# Patient Record
Sex: MALE | Race: WHITE | NOT HISPANIC OR LATINO | URBAN - METROPOLITAN AREA
[De-identification: names, ages, dates, MRNs, and addresses within clinical notes are randomized per-mention and may not be internally consistent; named-entity substitution may affect disease eponyms.]

---

## 2017-02-08 ENCOUNTER — EMERGENCY (EMERGENCY)
Facility: HOSPITAL | Age: 32
LOS: 0 days | Discharge: HOME | End: 2017-02-08
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
## 2017-06-27 DIAGNOSIS — R07.81 PLEURODYNIA: ICD-10-CM

## 2017-06-27 DIAGNOSIS — S00.81XA ABRASION OF OTHER PART OF HEAD, INITIAL ENCOUNTER: ICD-10-CM

## 2017-06-27 DIAGNOSIS — S22.32XA FRACTURE OF ONE RIB, LEFT SIDE, INITIAL ENCOUNTER FOR CLOSED FRACTURE: ICD-10-CM

## 2017-06-27 DIAGNOSIS — S16.1XXA STRAIN OF MUSCLE, FASCIA AND TENDON AT NECK LEVEL, INITIAL ENCOUNTER: ICD-10-CM

## 2018-07-09 ENCOUNTER — INPATIENT (INPATIENT)
Facility: HOSPITAL | Age: 33
LOS: 1 days | Discharge: HOME | End: 2018-07-11
Attending: INTERNAL MEDICINE | Admitting: INTERNAL MEDICINE

## 2018-07-09 VITALS
SYSTOLIC BLOOD PRESSURE: 125 MMHG | DIASTOLIC BLOOD PRESSURE: 68 MMHG | WEIGHT: 258.6 LBS | RESPIRATION RATE: 16 BRPM | HEART RATE: 64 BPM | HEIGHT: 72 IN | TEMPERATURE: 97 F

## 2018-07-09 DIAGNOSIS — G47.30 SLEEP APNEA, UNSPECIFIED: ICD-10-CM

## 2018-07-09 DIAGNOSIS — R56.9 UNSPECIFIED CONVULSIONS: ICD-10-CM

## 2018-07-09 RX ORDER — ACETAMINOPHEN 500 MG
650 TABLET ORAL EVERY 6 HOURS
Qty: 0 | Refills: 0 | Status: DISCONTINUED | OUTPATIENT
Start: 2018-07-09 | End: 2018-07-11

## 2018-07-09 RX ORDER — ENOXAPARIN SODIUM 100 MG/ML
40 INJECTION SUBCUTANEOUS EVERY 24 HOURS
Qty: 0 | Refills: 0 | Status: DISCONTINUED | OUTPATIENT
Start: 2018-07-09 | End: 2018-07-11

## 2018-07-09 NOTE — CONSULT NOTE ADULT - ATTENDING COMMENTS
Agree with the history and plan.  Srikanth is here for risk assessment and possible capturing of an episode that based on the history is suggestive of nocturnal seizure  will start the monitoring.  No AED  Seizure precaution

## 2018-07-09 NOTE — H&P ADULT - NSHPPHYSICALEXAM_GEN_ALL_CORE
PHYSICAL EXAM:  GENERAL: NAD, well-groomed, well-developed  HEAD:  Atraumatic, Normocephalic  EYES: EOMI, PERRLA, conjunctiva and sclera clear  NERVOUS SYSTEM:  Alert & Oriented X 4, Good concentration; Motor Strength 5/5 B/L upper and lower extremities; DTRs 2+ intact and symmetric  CHEST/LUNG: Clear to percussion bilaterally; No rales, rhonchi, wheezing, or rubs  HEART: Regular rate and rhythm; No murmurs, rubs, or gallops  ABDOMEN: Soft, Nontender, Nondistended; Bowel sounds present  EXTREMITIES:  2+ Peripheral Pulses, No clubbing, cyanosis, or edema  SKIN: No rashes or lesions

## 2018-07-09 NOTE — CONSULT NOTE ADULT - SUBJECTIVE AND OBJECTIVE BOX
Neurology/Epilepsy Consult:    ADALBERTO NELSON 33y Male  [id number]    Patient is a 33y old right-handed Male who presents for elective VEEG      HPI:  Several years ago patient started waking up at night with       PAST MEDICAL & SURGICAL HISTORY:  Obstructive sleep apnea  Deviated septum  H/O gynecomastia  H/O nasal septoplasty:         FAMILY HISTORY:  Sister - possible seizures as a teenager      Social History: (-) x 3      Risk factors:  Born full-term, no complications  Normal growth and development  No febrile seizures/CNS infections  No head trauma with loss of consciousness      Allergy:  No Known Allergies        Home Medications:  ZyrTEC 10 mg oral tablet: 1 tab(s) orally once a day as needed        MEDICATIONS  (STANDING):  enoxaparin Injectable 40 milliGRAM(s) SubCutaneous every 24 hours    MEDICATIONS  (PRN):  acetaminophen   Tablet 650 milliGRAM(s) Oral every 6 hours PRN For Temp greater than 38 C (100.4 F)  acetaminophen   Tablet. 650 milliGRAM(s) Oral every 6 hours PRN Mild Pain (1 - 3)  LORazepam   Injectable 2 milliGRAM(s) IV Push three times a day PRN generalized tonic-clonic seizure lasting longer than 2 minutes, or two consecutive seizures without return to baseline in-between      T(F): 96.8 (18 @ 10:15), Max: 96.8 (18 @ 10:15)  HR: 64 (18 @ 10:15) (64 - 64)  BP: 125/68 (18 @ 10:15) (125/68 - 125/68)  RR: 16 (18 @ 10:15) (16 - 16)  SpO2: --      Neurologic Examination:  General:  Appearance is consistent with chronologic age.  No abnormal facies.   General: The patient is oriented to person, place, time and date.  Recent and remote memory intact.  Follows 4-step directions. Fund of knowledge is intact and normal.  Language with normal repetition, comprehension and naming.  Nondysarthric.    Cranial nerves: EOMI w/o nystagmus, skew or reported double vision.  PERRL.  No ptosis/weakness of eyelid closure.  Facial sensation is normal with normal bite.  No facial asymmetry.  Hearing grossly intact b/l.  Palate elevates midline.  Tongue midline.  Motor examination:   Normal tone, bulk and range of motion.  No tenderness, twitching, tremors or involuntary movements.  Formal Muscle Strength Testin/5 UE; 5/5 LE.  No observable drift.  Reflexes:   2+ b/l pectoralis, biceps, triceps, brachioradialis, patella and Achilles.    Sensory examination:   Intact to light touch and pinprick, pain  Cerebellum:   FTN/HKS intact with normal AMY in all limbs.  No dysmetria or dysdiadokinesia.  Gait narrow based and normal.      REEG 2018 - normal      Assessment:  This is a 33y Male with h/o LUCI, and nocturnal episodes suggestive or parasomnia, but the possibility of coexisting seizure disorder needs to be ruled out.      Discussed with Dr. Ansari      Plan:   - VEEG for characterization and risk assessment  - Seizure precautions  - No AED   - Ativan 2mg IV PRN for generalized tonic-clonic seizure lasting longer than 2 minutes, or two consecutive seizures without return to baseline in-between (ordered)      Plan discussed with patient in details, all questions answered Neurology/Epilepsy Consult:    ADALBERTO NELSON 33y Male  MRN-[id number]    Patient is a 33y old right-handed Male who presents for elective VEEG      HPI:  Several years ago patient started waking up at night thinking that there people in the room, or that the picture on the wall was falling, etc. Patient remembered all of these events, and was able to go back to sleep after some time. Patient was seen by a pulmonologist, completed sleep study, and was diagnosed with LUCI. He started using C-PAP, but had difficulty tolerating it. Meanwhile, patient continued having occasional similar episodes at night (patient does not remember if there was any correlation between these episodes and using/not using C-PAP). About 3-4 months ago patient's wive witnessed nocturnal episode when patient suddenly sat up in bed, stretched his left arm, and it was shaking for a minute or so. Patient fell asleep afterwards, and had no memory of this event. The most recent episode happened 2 months ago when patient woke up at night thinking they had an intruder, woke up his wife, and it took several minutes until he was able to calm down and go back to bed. Few days ago patient started using new dental appliance for his LUCI.      PAST MEDICAL & SURGICAL HISTORY:  Obstructive sleep apnea  Deviated septum  H/O gynecomastia  H/O nasal septoplasty:         FAMILY HISTORY:  Sister - possible seizures as a teenager      Social History: (-) x 3      Risk factors:  Born full-term, no complications  Normal growth and development  No febrile seizures/CNS infections  No head trauma with loss of consciousness      Allergy:  No Known Allergies        Home Medications:  ZyrTEC 10 mg oral tablet: 1 tab(s) orally once a day as needed        MEDICATIONS  (STANDING):  enoxaparin Injectable 40 milliGRAM(s) SubCutaneous every 24 hours    MEDICATIONS  (PRN):  acetaminophen   Tablet 650 milliGRAM(s) Oral every 6 hours PRN For Temp greater than 38 C (100.4 F)  acetaminophen   Tablet. 650 milliGRAM(s) Oral every 6 hours PRN Mild Pain (1 - 3)  LORazepam   Injectable 2 milliGRAM(s) IV Push three times a day PRN generalized tonic-clonic seizure lasting longer than 2 minutes, or two consecutive seizures without return to baseline in-between      T(F): 96.8 (18 @ 10:15), Max: 96.8 (18 @ 10:15)  HR: 64 (18 @ 10:15) (64 - 64)  BP: 125/68 (18 @ 10:15) (125/68 - 125/68)  RR: 16 (18 @ 10:15) (16 - 16)  SpO2: --      Neurologic Examination:  General:  Appearance is consistent with chronologic age.  No abnormal facies.   General: The patient is oriented to person, place, time and date.  Recent and remote memory intact.  Follows 4-step directions. Fund of knowledge is intact and normal.  Language with normal repetition, comprehension and naming.  Nondysarthric.    Cranial nerves: EOMI w/o nystagmus, skew or reported double vision.  PERRL.  No ptosis/weakness of eyelid closure.  Facial sensation is normal with normal bite.  No facial asymmetry.  Hearing grossly intact b/l.  Palate elevates midline.  Tongue midline.  Motor examination:   Normal tone, bulk and range of motion.  No tenderness, twitching, tremors or involuntary movements.  Formal Muscle Strength Testin/5 UE; 5/5 LE.  No observable drift.  Reflexes:   2+ b/l pectoralis, biceps, triceps, brachioradialis, patella and Achilles.    Sensory examination:   Intact to light touch and pinprick, pain  Cerebellum:   FTN/HKS intact with normal AMY in all limbs.  No dysmetria or dysdiadokinesia.  Gait narrow based and normal.      REEG 2018 - normal      Assessment:  This is a 33y Male with h/o LUCI, and nocturnal episodes suggestive or parasomnia, but the possibility of coexisting seizure disorder needs to be ruled out.      Discussed with Dr. Ansari      Plan:   - VEEG for characterization and risk assessment  - Seizure precautions  - No AED   - Ativan 2mg IV PRN for generalized tonic-clonic seizure lasting longer than 2 minutes, or two consecutive seizures without return to baseline in-between (ordered)      Plan discussed with patient in details, all questions answered Neurology/Epilepsy Consult:    ADALBERTO NELSON 33y Male  MRN-[id number]    Patient is a 33y old right-handed Male who presents for elective VEEG      HPI:  Several years ago patient started waking up at night thinking that there people in the room, or that the picture on the wall was falling, etc. Patient remembered all of these events, and was able to go back to sleep after some time. Patient was seen by a pulmonologist, completed sleep study, and was diagnosed with LUCI. He started using C-PAP, but had difficulty tolerating it. Meanwhile, patient continued having occasional similar episodes at night (patient does not remember if there was any correlation between these episodes and using/not using C-PAP). About 3-4 months ago patient's wife witnessed nocturnal episode when patient suddenly sat up in bed, stretched his left arm, and it was shaking for a minute or so. Patient fell asleep afterwards, and had no memory of this event. The most recent episode happened 2 months ago when patient woke up at night thinking they had an intruder, woke up his wife, and it took several minutes until he was able to calm down and go back to bed. Few days ago patient started using new dental appliance for his LUCI.      PAST MEDICAL & SURGICAL HISTORY:  Obstructive sleep apnea  Deviated septum  H/O gynecomastia  H/O nasal septoplasty:         FAMILY HISTORY:  Sister - possible seizures as a teenager      Social History: (-) x 3      Risk factors:  Born full-term, no complications  Normal growth and development  No febrile seizures/CNS infections  No head trauma with loss of consciousness      Allergy:  No Known Allergies        Home Medications:  ZyrTEC 10 mg oral tablet: 1 tab(s) orally once a day as needed        MEDICATIONS  (STANDING):  enoxaparin Injectable 40 milliGRAM(s) SubCutaneous every 24 hours    MEDICATIONS  (PRN):  acetaminophen   Tablet 650 milliGRAM(s) Oral every 6 hours PRN For Temp greater than 38 C (100.4 F)  acetaminophen   Tablet. 650 milliGRAM(s) Oral every 6 hours PRN Mild Pain (1 - 3)  LORazepam   Injectable 2 milliGRAM(s) IV Push three times a day PRN generalized tonic-clonic seizure lasting longer than 2 minutes, or two consecutive seizures without return to baseline in-between      T(F): 96.8 (18 @ 10:15), Max: 96.8 (18 @ 10:15)  HR: 64 (18 @ 10:15) (64 - 64)  BP: 125/68 (18 @ 10:15) (125/68 - 125/68)  RR: 16 (18 @ 10:15) (16 - 16)  SpO2: --      Neurologic Examination:  General:  Appearance is consistent with chronologic age.  No abnormal facies.   General: The patient is oriented to person, place, time and date.  Recent and remote memory intact.  Follows 4-step directions. Fund of knowledge is intact and normal.  Language with normal repetition, comprehension and naming.  Nondysarthric.    Cranial nerves: EOMI w/o nystagmus, skew or reported double vision.  PERRL.  No ptosis/weakness of eyelid closure.  Facial sensation is normal with normal bite.  No facial asymmetry.  Hearing grossly intact b/l.  Palate elevates midline.  Tongue midline.  Motor examination:   Normal tone, bulk and range of motion.  No tenderness, twitching, tremors or involuntary movements.  Formal Muscle Strength Testin/5 UE; 5/5 LE.  No observable drift.  Reflexes:   2+ b/l pectoralis, biceps, triceps, brachioradialis, patella and Achilles.    Sensory examination:   Intact to light touch and pinprick, pain  Cerebellum:   FTN/HKS intact with normal AMY in all limbs.  No dysmetria or dysdiadokinesia.  Gait narrow based and normal.      REEG 2018 - normal      Assessment:  This is a 33y Male with h/o LUCI, and nocturnal episodes suggestive or parasomnia, but the possibility of coexisting seizure disorder needs to be ruled out.      Discussed with Dr. Ansari      Plan:   - VEEG for characterization and risk assessment  - Seizure precautions  - No AED   - Ativan 2mg IV PRN for generalized tonic-clonic seizure lasting longer than 2 minutes, or two consecutive seizures without return to baseline in-between (ordered)      Plan discussed with patient in details, all questions answered

## 2018-07-09 NOTE — H&P ADULT - HISTORY OF PRESENT ILLNESS
patient is here for scheduled VEEG.  Patient's pulmonologist recommended him to follow up with neurology to r/o seizures. He had an episode of focal seizures overnight left arm witnessed by wife as he sat up in bed and continued to shake for few seconds with re -collection. no tongue biting or bowel/urinary incontinence noted.   At present, denies any complaints, work as a ; appears comfortable and aware of VEEG monitoring

## 2018-07-11 ENCOUNTER — TRANSCRIPTION ENCOUNTER (OUTPATIENT)
Age: 33
End: 2018-07-11

## 2018-07-11 VITALS
RESPIRATION RATE: 16 BRPM | HEART RATE: 63 BPM | DIASTOLIC BLOOD PRESSURE: 57 MMHG | SYSTOLIC BLOOD PRESSURE: 120 MMHG | TEMPERATURE: 96 F

## 2018-07-11 NOTE — DISCHARGE NOTE ADULT - HOSPITAL COURSE
***patient seen and examined at bedside. Spent over 40mins d/c planning, d/c papers and med rec ***    Vital Signs Last 24 Hrs  T(C): 35.8 (11 Jul 2018 06:11), Max: 35.9 (10 Jul 2018 14:00)  T(F): 96.4 (11 Jul 2018 06:11), Max: 96.7 (10 Jul 2018 14:00)  HR: 63 (11 Jul 2018 06:11) (55 - 63)  BP: 120/57 (11 Jul 2018 06:11) (113/61 - 120/57)  RR: 16 (11 Jul 2018 06:11) (16 - 17)    patient admitted for r/o seizure ruled out and no AEDs needed  patient is stable from neuro perspective to be discharged home today  outpatient PMD and Neurology follow up

## 2018-07-11 NOTE — PROGRESS NOTE ADULT - SUBJECTIVE AND OBJECTIVE BOX
ADALBERTO NELSON  33y  Male      Patient is a 33y old  Male who presents with a chief complaint of VEEG (09 Jul 2018 12:04)      INTERVAL HPI/OVERNIGHT EVENTS:  no significant events  d/c planning as per neuro    REVIEW OF SYSTEMS:  CONSTITUTIONAL: No fever, weight loss, or fatigue  EYES: No eye pain, visual disturbances, or discharge  NECK: No pain or stiffness  RESPIRATORY: No cough, wheezing, chills or hemoptysis; No shortness of breath  CARDIOVASCULAR: No chest pain, palpitations, dizziness, or leg swelling  GASTROINTESTINAL: No abdominal or epigastric pain. No nausea, vomiting, or hematemesis; No diarrhea or constipation. No melena or hematochezia.  GENITOURINARY: No dysuria, frequency, hematuria, or incontinence  NEUROLOGICAL: No headaches, memory loss, loss of strength, numbness, or tremors  MUSCULOSKELETAL: No joint pain or swelling; No muscle, back, or extremity pain    Vital Signs Last 24 Hrs  T(C): 35.6 (10 Jul 2018 06:01), Max: 36 (09 Jul 2018 10:15)  T(F): 96.1 (10 Jul 2018 06:01), Max: 96.8 (09 Jul 2018 10:15)  HR: 63 (10 Jul 2018 06:01) (63 - 64)  BP: 100/61 (10 Jul 2018 06:01) (100/61 - 127/74)  RR: 16 (10 Jul 2018 06:01) (16 - 17)    PHYSICAL EXAM:  GENERAL: NAD, well-groomed, well-developed  HEAD:  Atraumatic, Normocephalic  EYES: EOMI, PERRLA, conjunctiva and sclera clear  NERVOUS SYSTEM:  Alert & Oriented X 4, Good concentration; Motor Strength 5/5 B/L upper and lower extremities; DTRs 2+ intact and symmetric  CHEST/LUNG: Clear to percussion bilaterally; No rales, rhonchi, wheezing, or rubs  HEART: Regular rate and rhythm; No murmurs, rubs, or gallops  ABDOMEN: Soft, Nontender, Nondistended; Bowel sounds present  EXTREMITIES:  2+ Peripheral Pulses, No clubbing, cyanosis, or edema  SKIN: No rashes or lesions    LAB:  no new labs    RADIOLOGY:    Imaging Personally Reviewed:  [Y ] YES  [ ] NO    HEALTH ISSUES - PROBLEM Dx:  Sleep apnea: Sleep apnea  Convulsions/seizures: Convulsions/seizures    MEDS:  acetaminophen   Tablet 650 milliGRAM(s) Oral every 6 hours PRN  acetaminophen   Tablet. 650 milliGRAM(s) Oral every 6 hours PRN  enoxaparin Injectable 40 milliGRAM(s) SubCutaneous every 24 hours  LORazepam   Injectable 2 milliGRAM(s) IV Push three times a day PRN
Epilepsy Attending Note:     ADALBERTO NELSON    33y Male  MRN [id number]    Vital Signs Last 24 Hrs  T(C): 35.6 (10 Jul 2018 06:01), Max: 35.8 (09 Jul 2018 22:00)  T(F): 96.1 (10 Jul 2018 06:01), Max: 96.4 (09 Jul 2018 22:00)  HR: 63 (10 Jul 2018 06:01) (63 - 63)  BP: 100/61 (10 Jul 2018 06:01) (100/61 - 127/74)  BP(mean): --  RR: 16 (10 Jul 2018 06:01) (16 - 17)  SpO2: --                MEDICATIONS  (STANDING):  enoxaparin Injectable 40 milliGRAM(s) SubCutaneous every 24 hours    MEDICATIONS  (PRN):  acetaminophen   Tablet 650 milliGRAM(s) Oral every 6 hours PRN For Temp greater than 38 C (100.4 F)  acetaminophen   Tablet. 650 milliGRAM(s) Oral every 6 hours PRN Mild Pain (1 - 3)  LORazepam   Injectable 2 milliGRAM(s) IV Push three times a day PRN generalized tonic-clonic seizure lasting longer than 2 minutes, or two consecutive seizures without return to baseline in-between            VEEG in the last 24 hours:    Background------8-9 hz    Focal and generalized slowing-----none    Interictal activity-------none    Events---none    Seizures---none    Impression: normal V-EEG     Plan - sleep deprivation,/ HV/PS
Epilepsy Attending Note:     ADALBERTO NELSON    33y Male  MRN [id number]    Vital Signs Last 24 Hrs  T(C): 35.8 (11 Jul 2018 06:11), Max: 35.9 (10 Jul 2018 14:00)  T(F): 96.4 (11 Jul 2018 06:11), Max: 96.7 (10 Jul 2018 14:00)  HR: 63 (11 Jul 2018 06:11) (55 - 63)  BP: 120/57 (11 Jul 2018 06:11) (113/61 - 120/57)  BP(mean): --  RR: 16 (11 Jul 2018 06:11) (16 - 17)  SpO2: --                MEDICATIONS  (STANDING):  enoxaparin Injectable 40 milliGRAM(s) SubCutaneous every 24 hours    MEDICATIONS  (PRN):  acetaminophen   Tablet 650 milliGRAM(s) Oral every 6 hours PRN For Temp greater than 38 C (100.4 F)  acetaminophen   Tablet. 650 milliGRAM(s) Oral every 6 hours PRN Mild Pain (1 - 3)  LORazepam   Injectable 2 milliGRAM(s) IV Push three times a day PRN generalized tonic-clonic seizure lasting longer than 2 minutes, or two consecutive seizures without return to baseline in-between            VEEG in the last 24 hours:    Background------8-9 hz    Focal and generalized slowing------no focal or generalized slowing    Interictal activity--------none    Events-------none    Seizures---none    Impression: normal A/D/S v-EEG    Plan - DC the monitoring

## 2018-07-11 NOTE — DISCHARGE NOTE ADULT - CARE PLAN
Principal Discharge DX:	Convulsions/seizures  Goal:	ruled out seizures on VEEG  Assessment and plan of treatment:	-outpatient follow up with neurology

## 2018-07-11 NOTE — DISCHARGE NOTE ADULT - CARE PROVIDER_API CALL
Rj Ansari), Neurology  [address] [place] [address]  Phone: [phone]  Fax: [phone]

## 2018-07-11 NOTE — DISCHARGE NOTE ADULT - PATIENT PORTAL LINK FT
You can access the [facility] Patient Portal, offered by [facility], by registering with the following website: [email or web address]

## 2018-07-17 DIAGNOSIS — Z03.89 ENCOUNTER FOR OBSERVATION FOR OTHER SUSPECTED DISEASES AND CONDITIONS RULED OUT: ICD-10-CM

## 2018-07-17 DIAGNOSIS — G47.33 OBSTRUCTIVE SLEEP APNEA (ADULT) (PEDIATRIC): ICD-10-CM

## 2018-07-17 DIAGNOSIS — G47.50 PARASOMNIA, UNSPECIFIED: ICD-10-CM

## 2018-08-22 ENCOUNTER — OUTPATIENT (OUTPATIENT)
Dept: OUTPATIENT SERVICES | Facility: HOSPITAL | Age: 33
LOS: 1 days | Discharge: HOME | End: 2018-08-22

## 2018-08-23 DIAGNOSIS — G47.33 OBSTRUCTIVE SLEEP APNEA (ADULT) (PEDIATRIC): ICD-10-CM

## 2018-08-23 PROBLEM — G47.30 SLEEP APNEA, UNSPECIFIED: Chronic | Status: ACTIVE | Noted: 2018-07-09

## 2019-03-14 NOTE — PATIENT PROFILE ADULT. - NSTOBACCONEVERSMOKERY/N_GEN_A
From: Rut Bone  To: Gris Mckeon CNP  Sent: 3/14/2019 11:24 AM CDT  Subject: Other    Message for Shivani or Gris,  Had my ALBERTO on 3/13/19. Awaiting further directions from Dr. Schulz.  Thank you,  Rut Bone   No

## 2019-12-19 PROBLEM — Z00.00 ENCOUNTER FOR PREVENTIVE HEALTH EXAMINATION: Status: ACTIVE | Noted: 2019-12-19

## 2020-01-15 ENCOUNTER — APPOINTMENT (OUTPATIENT)
Dept: OTOLARYNGOLOGY | Facility: CLINIC | Age: 35
End: 2020-01-15
Payer: COMMERCIAL

## 2020-01-15 VITALS
BODY MASS INDEX: 33.86 KG/M2 | HEART RATE: 75 BPM | WEIGHT: 250 LBS | DIASTOLIC BLOOD PRESSURE: 90 MMHG | HEIGHT: 72 IN | SYSTOLIC BLOOD PRESSURE: 127 MMHG

## 2020-01-15 DIAGNOSIS — H90.5 UNSPECIFIED SENSORINEURAL HEARING LOSS: ICD-10-CM

## 2020-01-15 DIAGNOSIS — Z83.3 FAMILY HISTORY OF DIABETES MELLITUS: ICD-10-CM

## 2020-01-15 DIAGNOSIS — Z78.9 OTHER SPECIFIED HEALTH STATUS: ICD-10-CM

## 2020-01-15 DIAGNOSIS — Z87.09 PERSONAL HISTORY OF OTHER DISEASES OF THE RESPIRATORY SYSTEM: ICD-10-CM

## 2020-01-15 PROCEDURE — 99204 OFFICE O/P NEW MOD 45 MIN: CPT | Mod: 25

## 2020-01-15 PROCEDURE — 92504 EAR MICROSCOPY EXAMINATION: CPT

## 2020-01-15 NOTE — PHYSICAL EXAM
[FreeTextEntry1] : Procedure: Microscopic Ear Exam\par \par Left ear:  Ear canal intact without inflammation or lesion.  \par Tympanic membrane intact without inflammation.\par \par Right ear:  Ear canal intact without inflammation or lesion.  \par Tympanic membrane intact without inflammation.\par \par  [Midline] : trachea located in midline position [Normal] : orientation to person, place, and time: normal

## 2020-01-15 NOTE — CONSULT LETTER
[Please see my note below.] : Please see my note below. [FreeTextEntry2] : Dear JOHN VARGAS  [FreeTextEntry1] : Thank you for allowing me to participate in the care of ADALBERTO NELSON .\par Please see the attached visit note.\par \par \par \par Henok Castillo\par Otology\par Department of Otolaryngology\par [facility]  [DrKate  ___] : Dr. LOZA

## 2020-01-15 NOTE — HISTORY OF PRESENT ILLNESS
[de-identified] : ADALBERTO NELSON is referred for second opinion.  He has a history of tinnitus in the left ear since approximately November 2019.  Tinnitus worsened with an URI that included Sinus and ear infections.  Faint tinnitus is noted in the left ear.  Also reports distortion of certain pitches.  Completed a course of steroid 3 weeks ago.\par No vertigo reported. \par \par Presented with sudden hearing loss in left ear in 2018 treated with IT steroids with good benefit.\par \par Had MRI in 2018 reportedly normal. 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

## 2020-01-15 NOTE — ASSESSMENT
[FreeTextEntry1] : History is consistent with a sudden low-frequency hearing loss presenting in 2018. Recent symptoms of increased hearing loss concurrent with an upper respiratory tract infection. It is unclear to me if there is true fluctuation of hearing. Recent audiometry is mostly stable as compared to prior testing.\par \par I have recommended clinical monitoring. I recommended noise protection. I have recommended followup in 8-12 weeks with repeat audiometry for serial monitoring. He questioned the possibility of Ménière's disease. Although this is not a classical presentation, this diagnosis remains possible.\par

## 2020-01-15 NOTE — DATA REVIEWED
[de-identified] : Audiometry dated December 23, 2019 provided and reviewed. Sloping mid to high frequency hearing loss in the left ear mild. Speech recognition intact bilaterally. Tympanometry normal bilaterally\par I. subsequently received previous audiometry from 2018 revealing a moderate low-frequency hearing loss in October 2018 which mostly recovered in November and December of 2018

## 2020-01-15 NOTE — REASON FOR VISIT
[Initial Evaluation] : an initial evaluation for [Tinnitus] : tinnitus [Hearing Loss] : hearing loss

## 2020-08-21 NOTE — DISCHARGE NOTE ADULT - MEDICATION SUMMARY - MEDICATIONS TO TAKE
Monitor. I will START or STAY ON the medications listed below when I get home from the hospital:    ZyrTEC 10 mg oral tablet  -- 1 tab(s) by mouth once a day  -- Indication: For allergies

## 2022-02-01 NOTE — ASU PATIENT PROFILE, ADULT - FALL HARM RISK - UNIVERSAL INTERVENTIONS
Bed in lowest position, wheels locked, appropriate side rails in place/Call bell, personal items and telephone in reach/Instruct patient to call for assistance before getting out of bed or chair/Non-slip footwear when patient is out of bed/[place] to call system/Purposeful Proactive Rounding/Room/bathroom lighting operational, light cord in reach

## 2022-02-02 ENCOUNTER — OUTPATIENT (OUTPATIENT)
Dept: OUTPATIENT SERVICES | Facility: HOSPITAL | Age: 37
LOS: 1 days | Discharge: HOME | End: 2022-02-02

## 2022-02-02 VITALS
HEART RATE: 70 BPM | TEMPERATURE: 98 F | OXYGEN SATURATION: 99 % | DIASTOLIC BLOOD PRESSURE: 69 MMHG | SYSTOLIC BLOOD PRESSURE: 118 MMHG | RESPIRATION RATE: 18 BRPM

## 2022-02-02 VITALS
TEMPERATURE: 98 F | DIASTOLIC BLOOD PRESSURE: 58 MMHG | WEIGHT: 231.93 LBS | RESPIRATION RATE: 18 BRPM | HEIGHT: 72 IN | HEART RATE: 68 BPM | SYSTOLIC BLOOD PRESSURE: 122 MMHG | OXYGEN SATURATION: 98 %

## 2022-02-02 RX ORDER — CETIRIZINE HYDROCHLORIDE 10 MG/1
1 TABLET ORAL
Qty: 0 | Refills: 0 | DISCHARGE

## 2022-02-02 RX ORDER — IBUPROFEN 200 MG
1 TABLET ORAL
Qty: 20 | Refills: 0
Start: 2022-02-02

## 2022-02-02 NOTE — ASU DISCHARGE PLAN (ADULT/PEDIATRIC) - NS MD DC FALL RISK RISK
For information on Fall & Injury Prevention, visit: [email or web address] OR  [email or web address] OR  https://www.cdc.gov/steadi/patient.html

## 2022-02-04 DIAGNOSIS — M65.4 RADIAL STYLOID TENOSYNOVITIS [DE QUERVAIN]: ICD-10-CM

## 2022-02-04 DIAGNOSIS — G47.30 SLEEP APNEA, UNSPECIFIED: ICD-10-CM

## 2022-02-04 DIAGNOSIS — J34.2 DEVIATED NASAL SEPTUM: ICD-10-CM

## 2023-04-03 ENCOUNTER — APPOINTMENT (OUTPATIENT)
Dept: ORTHOPEDIC SURGERY | Facility: CLINIC | Age: 38
End: 2023-04-03
Payer: COMMERCIAL

## 2023-04-03 DIAGNOSIS — M22.2X2 PATELLOFEMORAL DISORDERS, LEFT KNEE: ICD-10-CM

## 2023-04-03 PROCEDURE — 73562 X-RAY EXAM OF KNEE 3: CPT | Mod: LT

## 2023-04-03 PROCEDURE — 99204 OFFICE O/P NEW MOD 45 MIN: CPT

## 2023-04-03 NOTE — HISTORY OF PRESENT ILLNESS
[de-identified] : Chief Complaint: LT knee\par \par 37 year old male presents with LT knee pain. Denies trauma/injury. Works as a . He states he feels tightness and tension around the knee when having to go into different positions. Describes pain as a 5 at rest and a 6 with activity on the pain scale. Denies swelling. Denies buckling/giving-way. Denies numbness tingling. Has taken Tylenol and Motrin with no relief.\par \par Denies any medical conditions. Denies allergies to medication. \par \par X-ray done in office today, reviewed and analyzed. Shows well-maintained joint spaces, no fracture, arthritis, or soft tissue calcifications.\par \par On exam full ROM, ligaments intact, no effusion, negative homans, negative McMurays, no pain with palpation, no erythema.\par \par Dx: Patella-femoral syndrome\par \par Recommending formal physical therapy with a home program. Will follow-up in 6 weeks

## 2023-05-22 ENCOUNTER — APPOINTMENT (OUTPATIENT)
Dept: ORTHOPEDIC SURGERY | Facility: CLINIC | Age: 38
End: 2023-05-22

## 2025-06-12 NOTE — ASU PREOP CHECKLIST - LATEX ALLERGY
ED Care Companion Follow-Up Call    Situation: This patient triggered a clinical alert after a recent clinical encounter. A care transitions call occurred and is summarized below.    Background: Patient was discharged from the ED on 6/4/2025 from [facility] .    Assessment  Since ED discharge, patient is feeling same.  Patient reported symptoms: flank pain that worsens with urination. He states that he started taking ciprofloxacin 2 days ago that was prescribed with his provider. He denies any fever/chills.     Additional topics reviewed with patient:   Patient did receive ED after visit summary/education.   Patient has verbalized understanding of ED instructions/education.   Patient was prescribed new medications. Patient has picked up prescriptions from pharmacy.   Patient does not have questions or concerns regarding the medications prescribed.  Patient does have follow-up appointment with provider scheduled. Care Transition Nurse did not assist with scheduling follow-up appointment.     Interventions  Care Transitions nurse encouraged patient to continue to complete ED Care Companion questionnaire for a nurse to monitor virtually    Recommendation:  Reinforced patient instructions provided by ED provider, including continued self-monitoring of symptoms.  Patient verbalized understanding to contact PCP for worsening symptoms.   
no

## 2025-07-31 ENCOUNTER — NON-APPOINTMENT (OUTPATIENT)
Age: 40
End: 2025-07-31